# Patient Record
Sex: MALE | Race: WHITE | NOT HISPANIC OR LATINO | Employment: OTHER | URBAN - METROPOLITAN AREA
[De-identification: names, ages, dates, MRNs, and addresses within clinical notes are randomized per-mention and may not be internally consistent; named-entity substitution may affect disease eponyms.]

---

## 2021-01-08 ENCOUNTER — CLINICAL SUPPORT (OUTPATIENT)
Dept: URGENT CARE | Facility: CLINIC | Age: 31
End: 2021-01-08
Payer: MEDICARE

## 2021-01-08 DIAGNOSIS — Z11.52 ENCOUNTER FOR SCREENING LABORATORY TESTING FOR COVID-19 VIRUS: Primary | ICD-10-CM

## 2021-01-08 LAB
CTP QC/QA: YES
SARS-COV-2 RDRP RESP QL NAA+PROBE: NEGATIVE

## 2021-01-08 PROCEDURE — U0002: ICD-10-PCS | Mod: QW,CR,S$GLB, | Performed by: FAMILY MEDICINE

## 2021-01-08 PROCEDURE — U0002 COVID-19 LAB TEST NON-CDC: HCPCS | Mod: QW,CR,S$GLB, | Performed by: FAMILY MEDICINE

## 2022-07-06 ENCOUNTER — HOSPITAL ENCOUNTER (EMERGENCY)
Facility: HOSPITAL | Age: 32
Discharge: HOME | End: 2022-07-06
Attending: EMERGENCY MEDICINE
Payer: MEDICARE

## 2022-07-06 VITALS
OXYGEN SATURATION: 98 % | BODY MASS INDEX: 30.36 KG/M2 | RESPIRATION RATE: 17 BRPM | HEART RATE: 70 BPM | HEIGHT: 62 IN | DIASTOLIC BLOOD PRESSURE: 87 MMHG | SYSTOLIC BLOOD PRESSURE: 123 MMHG | TEMPERATURE: 99 F | WEIGHT: 165 LBS

## 2022-07-06 DIAGNOSIS — R53.1 WEAKNESS: ICD-10-CM

## 2022-07-06 LAB
ALBUMIN SERPL BCP-MCNC: 4.1 G/DL (ref 3.5–5.2)
ALP SERPL-CCNC: 58 U/L (ref 55–135)
ALT SERPL W/O P-5'-P-CCNC: 39 U/L (ref 10–44)
ANION GAP SERPL CALC-SCNC: 8 MMOL/L (ref 8–16)
AST SERPL-CCNC: 29 U/L (ref 10–40)
BASOPHILS # BLD AUTO: 0.05 K/UL (ref 0–0.2)
BASOPHILS NFR BLD: 0.7 % (ref 0–1.9)
BILIRUB SERPL-MCNC: 0.6 MG/DL (ref 0.1–1)
BILIRUB UR QL STRIP: NEGATIVE
BUN SERPL-MCNC: 11 MG/DL (ref 6–20)
CALCIUM SERPL-MCNC: 9 MG/DL (ref 8.7–10.5)
CHLORIDE SERPL-SCNC: 107 MMOL/L (ref 95–110)
CK SERPL-CCNC: 210 U/L (ref 20–200)
CLARITY UR: CLEAR
CO2 SERPL-SCNC: 21 MMOL/L (ref 23–29)
COLOR UR: YELLOW
CREAT SERPL-MCNC: 0.8 MG/DL (ref 0.5–1.4)
DIFFERENTIAL METHOD: ABNORMAL
EOSINOPHIL # BLD AUTO: 0.1 K/UL (ref 0–0.5)
EOSINOPHIL NFR BLD: 0.7 % (ref 0–8)
ERYTHROCYTE [DISTWIDTH] IN BLOOD BY AUTOMATED COUNT: 11.9 % (ref 11.5–14.5)
EST. GFR  (AFRICAN AMERICAN): >60 ML/MIN/1.73 M^2
EST. GFR  (NON AFRICAN AMERICAN): >60 ML/MIN/1.73 M^2
GLUCOSE SERPL-MCNC: 107 MG/DL (ref 70–110)
GLUCOSE UR QL STRIP: NEGATIVE
HCT VFR BLD AUTO: 47.9 % (ref 40–54)
HGB BLD-MCNC: 16.5 G/DL (ref 14–18)
HGB UR QL STRIP: NEGATIVE
IMM GRANULOCYTES # BLD AUTO: 0.02 K/UL (ref 0–0.04)
IMM GRANULOCYTES NFR BLD AUTO: 0.3 % (ref 0–0.5)
KETONES UR QL STRIP: NEGATIVE
LEUKOCYTE ESTERASE UR QL STRIP: NEGATIVE
LYMPHOCYTES # BLD AUTO: 2.2 K/UL (ref 1–4.8)
LYMPHOCYTES NFR BLD: 32.8 % (ref 18–48)
MAGNESIUM SERPL-MCNC: 1.9 MG/DL (ref 1.6–2.6)
MCH RBC QN AUTO: 31.1 PG (ref 27–31)
MCHC RBC AUTO-ENTMCNC: 34.4 G/DL (ref 32–36)
MCV RBC AUTO: 90 FL (ref 82–98)
MONOCYTES # BLD AUTO: 0.8 K/UL (ref 0.3–1)
MONOCYTES NFR BLD: 11.1 % (ref 4–15)
NEUTROPHILS # BLD AUTO: 3.7 K/UL (ref 1.8–7.7)
NEUTROPHILS NFR BLD: 54.4 % (ref 38–73)
NITRITE UR QL STRIP: NEGATIVE
NRBC BLD-RTO: 0 /100 WBC
PH UR STRIP: 7 [PH] (ref 5–8)
PLATELET # BLD AUTO: 244 K/UL (ref 150–450)
PMV BLD AUTO: 8.9 FL (ref 9.2–12.9)
POTASSIUM SERPL-SCNC: 3.3 MMOL/L (ref 3.5–5.1)
PROT SERPL-MCNC: 7.3 G/DL (ref 6–8.4)
PROT UR QL STRIP: NEGATIVE
RBC # BLD AUTO: 5.31 M/UL (ref 4.6–6.2)
SODIUM SERPL-SCNC: 136 MMOL/L (ref 136–145)
SP GR UR STRIP: 1.01 (ref 1–1.03)
TSH SERPL DL<=0.005 MIU/L-ACNC: 1.46 UIU/ML (ref 0.34–5.6)
URN SPEC COLLECT METH UR: NORMAL
UROBILINOGEN UR STRIP-ACNC: NEGATIVE EU/DL
WBC # BLD AUTO: 6.83 K/UL (ref 3.9–12.7)

## 2022-07-06 PROCEDURE — 80053 COMPREHEN METABOLIC PANEL: CPT | Performed by: STUDENT IN AN ORGANIZED HEALTH CARE EDUCATION/TRAINING PROGRAM

## 2022-07-06 PROCEDURE — 93010 EKG 12-LEAD: ICD-10-PCS | Mod: ,,, | Performed by: INTERNAL MEDICINE

## 2022-07-06 PROCEDURE — 80177 DRUG SCRN QUAN LEVETIRACETAM: CPT | Performed by: STUDENT IN AN ORGANIZED HEALTH CARE EDUCATION/TRAINING PROGRAM

## 2022-07-06 PROCEDURE — 84443 ASSAY THYROID STIM HORMONE: CPT | Performed by: STUDENT IN AN ORGANIZED HEALTH CARE EDUCATION/TRAINING PROGRAM

## 2022-07-06 PROCEDURE — 85025 COMPLETE CBC W/AUTO DIFF WBC: CPT | Performed by: STUDENT IN AN ORGANIZED HEALTH CARE EDUCATION/TRAINING PROGRAM

## 2022-07-06 PROCEDURE — 93010 ELECTROCARDIOGRAM REPORT: CPT | Mod: ,,, | Performed by: INTERNAL MEDICINE

## 2022-07-06 PROCEDURE — 83735 ASSAY OF MAGNESIUM: CPT | Performed by: STUDENT IN AN ORGANIZED HEALTH CARE EDUCATION/TRAINING PROGRAM

## 2022-07-06 PROCEDURE — 25000003 PHARM REV CODE 250: Performed by: STUDENT IN AN ORGANIZED HEALTH CARE EDUCATION/TRAINING PROGRAM

## 2022-07-06 PROCEDURE — 93005 ELECTROCARDIOGRAM TRACING: CPT | Performed by: INTERNAL MEDICINE

## 2022-07-06 PROCEDURE — 99285 EMERGENCY DEPT VISIT HI MDM: CPT | Mod: 25

## 2022-07-06 PROCEDURE — 82550 ASSAY OF CK (CPK): CPT | Performed by: STUDENT IN AN ORGANIZED HEALTH CARE EDUCATION/TRAINING PROGRAM

## 2022-07-06 PROCEDURE — 81003 URINALYSIS AUTO W/O SCOPE: CPT | Performed by: STUDENT IN AN ORGANIZED HEALTH CARE EDUCATION/TRAINING PROGRAM

## 2022-07-06 RX ORDER — ACETAMINOPHEN 500 MG
500-1000 TABLET ORAL EVERY 8 HOURS PRN
COMMUNITY
End: 2023-06-15 | Stop reason: CLARIF

## 2022-07-06 RX ORDER — MULTIVITAMIN
1 TABLET ORAL DAILY
COMMUNITY
Start: 2022-04-12

## 2022-07-06 RX ORDER — OMEGA-3-ACID ETHYL ESTERS 1 G/1
2 CAPSULE, LIQUID FILLED ORAL DAILY
COMMUNITY
End: 2023-06-15 | Stop reason: CLARIF

## 2022-07-06 RX ORDER — NAPROXEN 500 MG/1
500 TABLET ORAL 2 TIMES DAILY WITH MEALS
COMMUNITY
End: 2023-06-15 | Stop reason: CLARIF

## 2022-07-06 RX ORDER — CALCIUM CARBONATE/VITAMIN D3 600MG-5MCG
1 TABLET ORAL 2 TIMES DAILY
COMMUNITY
Start: 2022-04-20

## 2022-07-06 RX ORDER — TOPIRAMATE 50 MG/1
50 TABLET, FILM COATED ORAL 2 TIMES DAILY
COMMUNITY

## 2022-07-06 RX ORDER — POTASSIUM CHLORIDE 20 MEQ/1
40 TABLET, EXTENDED RELEASE ORAL ONCE
Status: COMPLETED | OUTPATIENT
Start: 2022-07-06 | End: 2022-07-06

## 2022-07-06 RX ORDER — OLANZAPINE 15 MG/1
15 TABLET ORAL NIGHTLY
COMMUNITY

## 2022-07-06 RX ORDER — GUANFACINE 4 MG/1
4 TABLET, EXTENDED RELEASE ORAL DAILY
COMMUNITY
End: 2023-06-15 | Stop reason: CLARIF

## 2022-07-06 RX ORDER — CALCIUM CARBONATE/VITAMIN D3 600MG-5MCG
TABLET ORAL
COMMUNITY
Start: 2022-04-20 | End: 2022-07-06 | Stop reason: CLARIF

## 2022-07-06 RX ORDER — FLUTICASONE PROPIONATE 50 MCG
1 SPRAY, SUSPENSION (ML) NASAL DAILY
Status: ON HOLD | COMMUNITY
End: 2023-07-31 | Stop reason: HOSPADM

## 2022-07-06 RX ORDER — LEVETIRACETAM 750 MG/1
750 TABLET ORAL 2 TIMES DAILY
COMMUNITY

## 2022-07-06 RX ORDER — CARBAMAZEPINE 200 MG/1
200 TABLET ORAL 2 TIMES DAILY
COMMUNITY

## 2022-07-06 RX ORDER — LORATADINE 10 MG/1
10 TABLET ORAL DAILY
COMMUNITY

## 2022-07-06 RX ORDER — LEVOTHYROXINE SODIUM 75 UG/1
50 TABLET ORAL
COMMUNITY

## 2022-07-06 RX ORDER — TIAGABINE HYDROCHLORIDE 12 MG/1
6 TABLET ORAL 2 TIMES DAILY
COMMUNITY

## 2022-07-06 RX ADMIN — POTASSIUM CHLORIDE 40 MEQ: 1500 TABLET, EXTENDED RELEASE ORAL at 05:07

## 2022-07-06 NOTE — ED PROVIDER NOTES
Encounter Date: 7/6/2022       History     Chief Complaint   Patient presents with    Fall     Pt c/o increased loss of balance and coordination issues over the last three weeks. Pt typically has a limp while walking but has recently had to use a walker to ambulate.      31-year-old male history of cerebral palsy, seizure disorder presents emergency room for 3 weeks of worsening generalized weakness.  No trauma, no headache or visual changes.  No loss of consciousness.  No dizziness.  Patient reports compliance with the medication.  No breakthrough seizures recently.  Patient states that weakness mostly in legs and he has had to transition to walking with a walker.  No chest pain or shortness of breath.        Review of patient's allergies indicates:   Allergen Reactions    Codeine Diarrhea and Other (See Comments)     .      Bupropion Other (See Comments)     .      Haloperidol Other (See Comments)     .      Meperidine Other (See Comments)     .      Risperidone Other (See Comments)     .       No past medical history on file.  No past surgical history on file.  No family history on file.     Review of Systems   Constitutional: Negative for chills, fatigue and fever.   HENT: Negative for congestion, hearing loss, sore throat and trouble swallowing.    Eyes: Negative for visual disturbance.   Respiratory: Negative for cough, chest tightness and shortness of breath.    Cardiovascular: Negative for chest pain.   Gastrointestinal: Negative for abdominal pain and nausea.   Endocrine: Negative for polyuria.   Genitourinary: Negative for difficulty urinating.   Musculoskeletal: Negative for arthralgias and myalgias.   Skin: Negative for rash.   Neurological: Negative for dizziness and headaches.   Psychiatric/Behavioral: The patient is not nervous/anxious.    All other systems reviewed and are negative.      Physical Exam     Initial Vitals [07/06/22 1501]   BP Pulse Resp Temp SpO2   123/87 70 17 99.3 °F (37.4 °C)  98 %      MAP       --         Physical Exam    Nursing note and vitals reviewed.  Constitutional: He appears well-developed and well-nourished.   HENT:   Head: Normocephalic and atraumatic.   Eyes: Conjunctivae and EOM are normal.   Neck: Neck supple.   Cardiovascular: Normal rate, regular rhythm, normal heart sounds and intact distal pulses.   Pulmonary/Chest: Breath sounds normal. No respiratory distress.   Abdominal: Abdomen is soft. He exhibits distension. There is no abdominal tenderness.   Musculoskeletal:      Cervical back: Neck supple.      Comments: Minor contractures with associated loss of muscle tone bilateral forearms/wrist.  Patient ambulates with limp, uses walker.     Neurological:   Patient is alert and oriented to person, place, time, and event. GCS 15: Motor 6, Verbal 5, Eye 4. No focal neurologic deficits.  No facial droop, dysarthria, or aphasia.     CN 2-12 Intact.   -Patient has no deviation of baseline vision. Normal Confrontation (CN II)  -Extraocular movements are full, physiologic nystagmus is present. Eyes converge equally and pupils constrict with near focus. (CN III, IV, VI)  -Patient able to fully open and close jaw. Equal sensation over bilateral temples, cheeks, and jawline. (CN V)  -Patient able to raise eyebrows and expand cheeks (CN VII)  -Patient able to lateralize sounds bilaterally (CN VIII)  -Uvula is midline and rises symmetrically with soft palate on phonation, active gag reflex (CN IX, X)  -Patient with equal rise of shoulders and equal strength on resisted rotation of neck b/l. Strength 5/5. (CN XI)  -Patient able to stick out tongue at midline and move side to side, resists against deviation by me (CN XII)  PERRLA. No visual field defects.  Strength 5/5 bilateral upper and lower extremities. No atrophy. Reflexes 2+ throughout.  Negative Romberg.   No decreased proprioception sensation to suggest dorsal column injury. Patient able to verbalize up/down of DIP joint.  Patient able to identify pen in hand with eyes closed.   No decreased sensation to light touch, pain, or pressure to suggest spinothalamic pathway injury.  No cerebellar signs:  -No dysmetria. Heel-to-Shin and Finger-To-Nose b/l with smooth movements and no overshoot.   -No dysdiadochokinesis. Hand and Feet rapid alternating movements are quick, smooth, and rhythmic b/l.   -No pronator drift.  Gait is not abnormal. Not wide, patient able to walk heel-to-toe.     Skin: Skin is warm and dry. Capillary refill takes less than 2 seconds.   Psychiatric: He has a normal mood and affect. His behavior is normal. Judgment and thought content normal.         ED Course   Procedures  Labs Reviewed   CBC W/ AUTO DIFFERENTIAL - Abnormal; Notable for the following components:       Result Value    MCH 31.1 (*)     MPV 8.9 (*)     All other components within normal limits   COMPREHENSIVE METABOLIC PANEL - Abnormal; Notable for the following components:    Potassium 3.3 (*)     CO2 21 (*)     All other components within normal limits   CK - Abnormal; Notable for the following components:     (*)     All other components within normal limits   URINALYSIS, REFLEX TO URINE CULTURE    Narrative:     Specimen Source->Urine   TSH   MAGNESIUM   CARBAMAZEPINE LEVEL, TOTAL   LEVETIRACETAM  (KEPPRA) LEVEL        ECG Results          EKG 12-lead (In process)  Result time 07/06/22 16:55:58    In process by Interface, Lab In [facility] (07/06/22 16:55:58)                 Narrative:    Test Reason : R53.1,    Vent. Rate : 058 BPM     Atrial Rate : 058 BPM     P-R Int : 134 ms          QRS Dur : 084 ms      QT Int : 440 ms       P-R-T Axes : 049 029 029 degrees     QTc Int : 431 ms    Sinus bradycardia with sinus arrhythmia  Otherwise normal ECG  No previous ECGs available    Referred By: [email or web address]   SELF           Confirmed By:                             Imaging Results          X-Ray Shunt Series (Final result)  Result time 07/06/22 16:28:05     Final result by Justice Moise MD (07/06/22 16:28:05)                 Narrative:    Reason: b/l LE weakness    FINDINGS:    Left  shunt noted with shunt coursing through the left neck soft tissues and left anterior chest wall into the abdomen with tube terminating in right the hemiabdomen. No kinking of tube identified. The lungs are clear bilaterally. Unremarkable bowel gas pattern. Degenerative changes of the cervical spine observed. No gross soft tissue abnormality.    IMPRESSION:    Left  shunt catheter is grossly intact.    Electronically signed by:  Justice Moise DO  7/6/2022 4:28 PM CDT Workstation: [id number]                             CT Head Without Contrast (Final result)  Result time 07/06/22 15:58:48    Final result by Justice Moise MD (07/06/22 15:58:48)                 Narrative:    CMS MANDATED QUALITY DATA - CT RADIATION - 436    All CT scans at this facility utilize dose modulation, iterative reconstruction, and/or weight based dosing when appropriate to reduce radiation dose to as low as reasonably achievable.        Reason:  shunt with b/l LE weakness Balance off, hx of shunt surgery    TECHNIQUE: Head CT without IV contrast.    COMPARISON: None    FINDINGS:    Left  shunt noted with tip in the region of the right foramen of Em. Left cerebral hemisphere encephalomalacia observed. Gray-white matter distinction is otherwise preserved throughout the brain. No intracranial hemorrhage observed.    The ventricles and cisterns are maintained.    Calvarium is intact. Visualized sinuses are clear.    IMPRESSION:    1.  Left  shunt catheter noted.  2.  Left cerebral hemisphere encephalomalacia.  3.  No acute intracranial abnormality.        Electronically signed by:  Justice Moise DO  7/6/2022 3:58 PM CDT Workstation: [id number]                               Medications   potassium chloride SA CR tablet 40 mEq (40 mEq Oral Given 7/6/22 0940)     Medical Decision Making:   Initial  Assessment:   Nontoxic, well-appearing and in no acute distress.  No focal findings on initial neurologic exam.  Differential Diagnosis:    shunt occlusion, encephalopathy, myopathy, metabolic abnormality, infection  ED Management:  31-year-old male history of cerebral palsy with subsequent seizure disorder and  shunt presenting to the emergency room for 3 weeks of worsening bilateral lower extremity weakness.  No trauma, no abnormal neurologic findings.  CT head without acute intracranial abnormality but with left cerebral hemisphere encephalomalacia with  shunt catheter.  Shunt series plain films demonstrate grossly intact catheter.  Reexamination with no changes in neurologic status/neuro exam.  EKG sinus bradycardia, no ischemic changes.  Normal axis, normal intervals.  Labs with mild hypokalemia, however pleaded.  Mild CK elevation.  Overall, patient is nontoxic, well appearing with no acute or focal neurologic findings, no acute findings on imaging and stable neurologic exam is as without abnormal findings.  Plan for discharge home in stable condition with recommendation for close follow-up with established neurologist.  Patient and family members verbalized understanding.    Disposition:  Improved, discharged  Plan to discharge home with appropriate follow-up, including primary care manager.    I discussed the findings and plan of care with this patient.  All questions were answered to the patient's satisfaction.  Disposition plan as above.  Verbal and written discharge instructions provided to the patient on discharge.  Return precautions discussed prior to discharge.     I discuss this patient case with the cosigning physician, who agrees with diagnosis and plan of care. This note was written using the assistance of a dictation program and may contain grammatical errors.                       Clinical Impression:   Final diagnoses:  [R53.1] Weakness          ED Disposition Condition    Discharge  Stable        ED Prescriptions     None        Follow-up Information     Follow up With Specialties Details Why Contact Info Additional Information    Established Neurologist  Call in 1 day       [facility] - Emergency Dept Emergency Medicine Go to  As needed, If symptoms worsen [address] [address]  [phone] 1st floor           Charles Lopez PA-C  07/06/22 2000

## 2022-07-06 NOTE — DISCHARGE INSTRUCTIONS
Return to the emergency room promptly for any new or worsening symptoms.  Call your neurologist tomorrow for soonest possible follow-up appointment.

## 2022-07-12 LAB — LEVETIRACETAM SERPL-MCNC: 13 UG/ML (ref 10–40)

## 2023-05-29 ENCOUNTER — TELEPHONE (OUTPATIENT)
Dept: NEUROSURGERY | Facility: CLINIC | Age: 33
End: 2023-05-29
Payer: MEDICARE

## 2023-05-29 NOTE — TELEPHONE ENCOUNTER
----- Message from Ada Putnam sent at 5/26/2023 10:28 AM CDT -----  Regarding: advice  Contact: Dr Chidi stanley for [facility]  Type: Needs Medical Advice  Who Called:  Dr Chidi stanley for [facility]  Symptoms (please be specific):    How long has patient had these symptoms:    Pharmacy name and phone #:    Best Call Back Number: [phone]  Additional Information: Doctor is trying to help his investigator's son find a neurosurgeon that can remove a vagal nerve stimulator. Patient is needing a MRI as soon as possible. Patient is currently in a group home and is having a neuromuscular decline. Please call Dr Tafoya to advise or call patient's father Gabriel Low at [phone]. Thanks!

## 2023-05-31 ENCOUNTER — OFFICE VISIT (OUTPATIENT)
Dept: NEUROSURGERY | Facility: CLINIC | Age: 33
End: 2023-05-31
Payer: MEDICARE

## 2023-05-31 VITALS
RESPIRATION RATE: 18 BRPM | DIASTOLIC BLOOD PRESSURE: 104 MMHG | HEART RATE: 68 BPM | WEIGHT: 164.88 LBS | HEIGHT: 62 IN | SYSTOLIC BLOOD PRESSURE: 154 MMHG | BODY MASS INDEX: 30.34 KG/M2

## 2023-05-31 DIAGNOSIS — G40.909 NONINTRACTABLE EPILEPSY WITHOUT STATUS EPILEPTICUS, UNSPECIFIED EPILEPSY TYPE: Primary | ICD-10-CM

## 2023-05-31 PROCEDURE — 99204 PR OFFICE/OUTPT VISIT, NEW, LEVL IV, 45-59 MIN: ICD-10-PCS | Mod: S$GLB,,, | Performed by: NEUROLOGICAL SURGERY

## 2023-05-31 PROCEDURE — 99204 OFFICE O/P NEW MOD 45 MIN: CPT | Mod: S$GLB,,, | Performed by: NEUROLOGICAL SURGERY

## 2023-05-31 RX ORDER — GABAPENTIN 300 MG/1
300 CAPSULE ORAL NIGHTLY
COMMUNITY

## 2023-05-31 NOTE — PROGRESS NOTES
Neurosurgery History & Physical    Patient ID: Esvin Low is a 32 y.o. male.    Chief Complaint   Patient presents with    Shunt Problem     Needs vns removed due to forthcoming mri.       History of Present Illness:   Mr. 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 He has had partial epilepsy and VNS placed in 2006 at [facility] by Dr. Tricia Sanderson. His mother reports that seizures stopped after the stimulator was placed. The lead broke/disconnected about 1 year after it was placed.     He is established with Dr. Ridley in Neurology in [place] who recently retired. He has not interrogated or addressed the VNS in any way.    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 However he needs an MRI for further workup and cannot have one with the VNS implant. This workup began after he has had progressive extremity weakness. He was walking until about 8 months ago.     Initially workup began with lumbar spine issues. He saw Dr. Hernandez who ruled out significant neural compression, only minor degenerative changes.     He presents today to discuss removal in order for him to undergo imaging but concerned that this could cause return of seizures.      The VNS is Liva[employer].     Review of Systems  No Fevers/chills/nausea/vomiting according to caretakers    History reviewed. No pertinent past medical history.  Social History     Socioeconomic History    Marital status: Single     History reviewed. No pertinent family history.  Review of patient's allergies indicates:   Allergen Reactions    Codeine Diarrhea and Other (See Comments)     .      Bupropion Other (See Comments)     .      Haloperidol Other (See Comments)     .      Meperidine Other (See Comments)     .      Risperidone Other (See Comments)     .         Current Outpatient Medications:     acetaminophen (TYLENOL) 500 MG tablet, Take 500-1,000 mg by mouth every 8 (eight) hours  "as needed., Disp: , Rfl:     calcium-vitamin D tablet 600 mg-200 units, Take 1 tablet by mouth 2 (two) times daily., Disp: , Rfl:     carBAMazepine (TEGRETOL) 200 mg tablet, Take 200 mg by mouth 2 (two) times a day., Disp: , Rfl:     gabapentin (NEURONTIN) 300 MG capsule, Take 300 mg by mouth once daily., Disp: , Rfl:     guanFACINE (INTUNIV ER) 4 mg Tb24, Take 4 mg by mouth once daily., Disp: , Rfl:     levETIRAcetam (KEPPRA) 750 MG Tab, Take 750 mg by mouth 2 (two) times daily., Disp: , Rfl:     levothyroxine (SYNTHROID) 75 MCG tablet, Take 75 mcg by mouth before breakfast., Disp: , Rfl:     loratadine (CLARITIN) 10 mg tablet, Take 10 mg by mouth once daily., Disp: , Rfl:     naproxen (NAPROSYN) 500 MG tablet, Take 500 mg by mouth 2 (two) times daily with meals., Disp: , Rfl:     OLANZapine (ZYPREXA) 15 MG Tab, Take 15 mg by mouth nightly., Disp: , Rfl:     omega-3 acid ethyl esters (LOVAZA) 1 gram capsule, Take 2 g by mouth once daily., Disp: , Rfl:     ONE DAILY MULTIVITAMIN tablet, Take 1 tablet by mouth once daily., Disp: , Rfl:     tiaGABine 12 mg tablet, Take 6 mg by mouth 2 (two) times a day., Disp: , Rfl:     topiramate (TOPAMAX) 50 MG tablet, Take 50 mg by mouth 2 (two) times daily., Disp: , Rfl:     fluticasone propionate (FLONASE) 50 mcg/actuation nasal spray, 1 spray by Each Nostril route once daily., Disp: , Rfl:   No current facility-administered medications for this visit.  Blood pressure (!) 154/104, pulse 68, resp. rate 18, height 5' 2" (1.575 m), weight 74.8 kg (164 lb 14.5 oz).      Neurologic Exam  Awake  Oriented to self  Sitting in wheelchair  Generator incision over right chest.  Lead incision over left neck, both well healed.    Imaging:   Xray shunt series dated 7/6/2022 is personally reviewed and discussed with the patient and caretakers.  The VNS generator is over the right chest.  The lead up towards the left neck.  The lead appears fractured at approximately the level of C7 in the " neck.    Assessment & Plan:   Mr. Low is a 32 year old gentleman with progressive extremity weakness currently undergoing workup for CIDP.     His long term neurologist recently retired and has not had the VNS interrogated in the recent past.  It is suspected that the generator is no longer working and that the lead is broken in the chest.  The patient has not had any seizures in some time.  The patient needs an MRI and there is hesitation from radiology about getting an MRI with VNS.      Recommended he establish with a seizure Neurologist.     Will reach out to Dr. Hastings to help with sooner appt and discuss with neuroradiologist at [facility] to discuss limitations and protocol for obtaining MRI in VNS patient.     We are certainly available for removal of whatever VNS components that are technically feasible however I want to verify that it is absolutely necessary for obtaining an MRI as well as that we do not risk him starting with seizures once again.

## 2023-06-06 ENCOUNTER — PATIENT MESSAGE (OUTPATIENT)
Dept: NEUROSURGERY | Facility: CLINIC | Age: 33
End: 2023-06-06
Payer: MEDICARE

## 2023-06-08 NOTE — TELEPHONE ENCOUNTER
Spoke with patient's mother regarding plan for surgery after Dr. Morejon's discussion with Dr. Hastings in Neurology in [place].     Returned call with more specifics, no answer. Will return call if needed or have nurse discuss with surgical planning.

## 2023-06-09 ENCOUNTER — TELEPHONE (OUTPATIENT)
Dept: NEUROSURGERY | Facility: CLINIC | Age: 33
End: 2023-06-09
Payer: MEDICARE

## 2023-06-09 DIAGNOSIS — G40.909 NONINTRACTABLE EPILEPSY WITHOUT STATUS EPILEPTICUS, UNSPECIFIED EPILEPSY TYPE: Primary | ICD-10-CM

## 2023-06-09 DIAGNOSIS — G40.901: ICD-10-CM

## 2023-06-09 DIAGNOSIS — R79.1 ABNORMAL COAGULATION PROFILE: ICD-10-CM

## 2023-06-09 RX ORDER — SODIUM CHLORIDE, SODIUM LACTATE, POTASSIUM CHLORIDE, CALCIUM CHLORIDE 600; 310; 30; 20 MG/100ML; MG/100ML; MG/100ML; MG/100ML
INJECTION, SOLUTION INTRAVENOUS CONTINUOUS
Status: CANCELLED | OUTPATIENT
Start: 2023-06-09

## 2023-06-09 RX ORDER — LIDOCAINE HYDROCHLORIDE 10 MG/ML
1 INJECTION, SOLUTION EPIDURAL; INFILTRATION; INTRACAUDAL; PERINEURAL ONCE
Status: CANCELLED | OUTPATIENT
Start: 2023-06-09 | End: 2023-06-09

## 2023-06-09 NOTE — TELEPHONE ENCOUNTER
----- Message from Pamela Gunn sent at 6/9/2023 10:00 AM CDT -----  Type:  Patient Returning Call    Who Called:  pt's mother (Millicent)  Who Left Message for Patient:  Danielle Michaud  Does the patient know what this is regarding?:  removing a device.  Best Call Back Number:  [phone]  Additional Information:  pt's mother stepped away from phone when she received the phone call. Please call back to advise. Thanks!

## 2023-06-15 ENCOUNTER — TELEPHONE (OUTPATIENT)
Dept: NEUROSURGERY | Facility: CLINIC | Age: 33
End: 2023-06-15
Payer: MEDICARE

## 2023-06-15 DIAGNOSIS — Z98.890 HISTORY OF REMOVAL OF RETAINED HARDWARE: Primary | ICD-10-CM

## 2023-06-15 DIAGNOSIS — R79.1 ABNORMAL COAGULATION PROFILE: ICD-10-CM

## 2023-06-15 DIAGNOSIS — G40.901 NONINTRACTABLE EPILEPSY WITH STATUS EPILEPTICUS, UNSPECIFIED EPILEPSY TYPE: ICD-10-CM

## 2023-06-15 RX ORDER — SODIUM CHLORIDE, SODIUM LACTATE, POTASSIUM CHLORIDE, CALCIUM CHLORIDE 600; 310; 30; 20 MG/100ML; MG/100ML; MG/100ML; MG/100ML
INJECTION, SOLUTION INTRAVENOUS CONTINUOUS
Status: CANCELLED | OUTPATIENT
Start: 2023-06-15

## 2023-06-15 RX ORDER — LIDOCAINE HYDROCHLORIDE 10 MG/ML
1 INJECTION, SOLUTION EPIDURAL; INFILTRATION; INTRACAUDAL; PERINEURAL ONCE
Status: CANCELLED | OUTPATIENT
Start: 2023-06-15 | End: 2023-06-15

## 2023-06-15 NOTE — TELEPHONE ENCOUNTER
----- Message from Abby Wright LPN sent at 6/15/2023  1:22 PM CDT -----  Regarding: pending case dos 6/16/23  Your request for surgery IP dos 6/16/23 was not approved because [employer] is stating that you are OON and the patient does not have OON benefit.   Ms Acevedo ,pt mother, was notified of denial and instructed to follow up with your office.  Please advise if you will continue with the case or cancel the procedure.   Thanks,   Abby Wright LPN  Precertification Nurse  [facility]  PHONE: [phone]  FAX:[phone]  [email or web address]

## 2023-06-23 ENCOUNTER — TELEPHONE (OUTPATIENT)
Dept: NEUROSURGERY | Facility: CLINIC | Age: 33
End: 2023-06-23
Payer: MEDICARE

## 2023-07-06 ENCOUNTER — TELEPHONE (OUTPATIENT)
Dept: NEUROSURGERY | Facility: CLINIC | Age: 33
End: 2023-07-06
Payer: MEDICARE

## 2023-07-06 NOTE — TELEPHONE ENCOUNTER
----- Message from Tamica Saini LPN sent at 7/5/2023 10:17 AM CDT -----  Contact: Pt Michael Ricks    ----- Message -----  From: Kush Poole, Patient Care Assistant  Sent: 7/5/2023   8:40 AM CDT  To: [facility] Staff    Type: Needs Medical Advice    Who Called: Pt Michael Ricks   Best Call Back Number: [phone] or  Maricel-Caregiver [phone]  Inquiry/Question: Millicent is calling to get the pt scheduled for a procedure. Please advise. Thank you~

## 2023-07-06 NOTE — TELEPHONE ENCOUNTER
Gave caregiver CPT code 30368 for VNS removal so that she can call insurance to get an estimate of copay and authorization

## 2023-07-13 ENCOUNTER — TELEPHONE (OUTPATIENT)
Dept: NEUROSURGERY | Facility: CLINIC | Age: 33
End: 2023-07-13
Payer: MEDICARE

## 2023-07-13 NOTE — TELEPHONE ENCOUNTER
Patient now has medicare a and b and would like to have surgery with Dr. Morejon. I told her we can plan for surgery on 7/31 1st case at this time. She asked that we keep in contact with [facility] [place], [place] #[phone] with all scheduling. Waiting on surgical sheet from Dr. Morejon for surgery to be put in.

## 2023-07-14 DIAGNOSIS — G40.901 NONINTRACTABLE EPILEPSY WITH STATUS EPILEPTICUS, UNSPECIFIED EPILEPSY TYPE: Primary | ICD-10-CM

## 2023-07-14 DIAGNOSIS — R79.1 ABNORMAL COAGULATION PROFILE: ICD-10-CM

## 2023-07-14 RX ORDER — LIDOCAINE HYDROCHLORIDE 10 MG/ML
1 INJECTION, SOLUTION EPIDURAL; INFILTRATION; INTRACAUDAL; PERINEURAL ONCE
Status: CANCELLED | OUTPATIENT
Start: 2023-07-14 | End: 2023-07-14

## 2023-07-14 RX ORDER — SODIUM CHLORIDE, SODIUM LACTATE, POTASSIUM CHLORIDE, CALCIUM CHLORIDE 600; 310; 30; 20 MG/100ML; MG/100ML; MG/100ML; MG/100ML
INJECTION, SOLUTION INTRAVENOUS CONTINUOUS
Status: CANCELLED | OUTPATIENT
Start: 2023-07-14

## 2023-07-18 ENCOUNTER — TELEPHONE (OUTPATIENT)
Dept: NEUROSURGERY | Facility: CLINIC | Age: 33
End: 2023-07-18
Payer: MEDICARE

## 2023-07-18 NOTE — TELEPHONE ENCOUNTER
Called patient's mother Myron( manager of [facility]) regarding upcoming surgery on 7/31/23 with Dr. Morejon. Pre and post-operative appointments reviewed. Patient aware of stopping all anti-coagulant, anti-inflammatory, anti-platelet medications for 1 week prior to surgery. Address confirmed and appointment reminder letter and post-operative instructions mailed to patient. Informed patient to call with any further questions. Patient verbalized understanding.

## 2023-07-21 ENCOUNTER — TELEPHONE (OUTPATIENT)
Dept: NEUROSURGERY | Facility: CLINIC | Age: 33
End: 2023-07-21
Payer: MEDICARE

## 2023-07-21 NOTE — TELEPHONE ENCOUNTER
----- Message from Karen Herrera sent at 7/20/2023  4:19 PM CDT -----  Regarding: cardiac clearance  In addition to my previous message, Dr. Breaux was notified of pt's EKG and stated that pt will now need a cardiac clearance. Thank you for your help with this matter.  Please ask for either Karen or Sarah if you call here at PAT.  [phone]

## 2023-07-25 ENCOUNTER — TELEPHONE (OUTPATIENT)
Dept: NEUROSURGERY | Facility: CLINIC | Age: 33
End: 2023-07-25
Payer: MEDICARE

## 2023-07-27 ENCOUNTER — OFFICE VISIT (OUTPATIENT)
Dept: CARDIOLOGY | Facility: CLINIC | Age: 33
End: 2023-07-27
Payer: MEDICARE

## 2023-07-27 VITALS
DIASTOLIC BLOOD PRESSURE: 88 MMHG | HEART RATE: 96 BPM | HEIGHT: 65 IN | BODY MASS INDEX: 23.47 KG/M2 | WEIGHT: 140.88 LBS | SYSTOLIC BLOOD PRESSURE: 132 MMHG

## 2023-07-27 DIAGNOSIS — G12.21 ALS (AMYOTROPHIC LATERAL SCLEROSIS): Chronic | ICD-10-CM

## 2023-07-27 DIAGNOSIS — I10 PRIMARY HYPERTENSION: Primary | Chronic | ICD-10-CM

## 2023-07-27 DIAGNOSIS — R01.1 SYSTOLIC MURMUR: ICD-10-CM

## 2023-07-27 DIAGNOSIS — Z01.810 ENCOUNTER FOR PRE-OPERATIVE CARDIOVASCULAR CLEARANCE: ICD-10-CM

## 2023-07-27 DIAGNOSIS — I51.7 LVH (LEFT VENTRICULAR HYPERTROPHY): ICD-10-CM

## 2023-07-27 DIAGNOSIS — R53.2 COMPLETE IMMOBILITY DUE TO SEVERE PHYSICAL DISABILITY OR FRAILTY: Chronic | ICD-10-CM

## 2023-07-27 PROCEDURE — 99204 OFFICE O/P NEW MOD 45 MIN: CPT | Mod: S$PBB,,, | Performed by: INTERNAL MEDICINE

## 2023-07-27 PROCEDURE — 99999 PR PBB SHADOW E&M-EST. PATIENT-LVL IV: CPT | Mod: PBBFAC,,, | Performed by: INTERNAL MEDICINE

## 2023-07-27 PROCEDURE — 99204 PR OFFICE/OUTPT VISIT, NEW, LEVL IV, 45-59 MIN: ICD-10-PCS | Mod: S$PBB,,, | Performed by: INTERNAL MEDICINE

## 2023-07-27 PROCEDURE — 99214 OFFICE O/P EST MOD 30 MIN: CPT | Mod: PBBFAC,PO | Performed by: INTERNAL MEDICINE

## 2023-07-27 PROCEDURE — 99999 PR PBB SHADOW E&M-EST. PATIENT-LVL IV: ICD-10-PCS | Mod: PBBFAC,,, | Performed by: INTERNAL MEDICINE

## 2023-07-27 NOTE — PROGRESS NOTES
Subjective:    Patient ID:  Esvin Low is a 32 y.o. male who presents for Establish Care, Hypertension, and PREOP CLEARANCE        HPI  NP EVALUATION, PRIOR TO SURGERY TO REMOVE VAGAL NERVE STIMULATOR, TO BE ABLE TO GET MRI, FOR MORE DEFINITIVE DIAGNOSIS ALS VERSUS OTHER DEMYELINATING DISEASE,  LIVES IN A GROUP HOME, ACCOMPANIED BY MOTHER, PROGRESSIVE NEUROLOGIC ISSUES FOR OVER A YEAR, IN ,HAS NOT WALKED SINCE LAST December,  RECENT KIDNEY STONE REMOVAL UNDER GENERAL ANESTHESIA, NO COMPLICATIONS, NO SYMPTOMS OF ANGINA OR HEART FAILURE, NO FH OF CAD, SEE ROS    Past Medical History:   Diagnosis Date    Cerebral palsy, unspecified     CIDP (chronic inflammatory demyelinating polyneuropathy)     Fecal incontinence     Intracerebral hemorrhage     AT BIRTH    Mentally disabled     5TH GRADE LEVEL    Renal disorder     RIGHT KIDNEY STONES    Seizures     PARTIAL EPILEPSY    Thyroid disease     hypo, per MD notes     Past Surgical History:   Procedure Laterality Date    CYSTOURETEROSCOPY  06/01/2023    URETERAL STENT    FEET SURGERY      HERNIA REPAIR      RIGHT FEMUR      WITH HARDWARE    VAGAL NERVE STIMULATOR REMOVAL      VENTRICULOPERITONEAL SHUNT       No family history on file.  Social History     Socioeconomic History    Marital status: Single   Tobacco Use    Smoking status: Never    Smokeless tobacco: Never   Substance and Sexual Activity    Alcohol use: Yes     Comment: occasional beer    Drug use: Never       Review of patient's allergies indicates:   Allergen Reactions    Codeine Diarrhea and Other (See Comments)     .Hyperactivity, no pain control    Bupropion Other (See Comments)     .      Meperidine Other (See Comments)     .  Mother doesn't remember reaction    Risperidone Other (See Comments)     .      Haloperidol Other (See Comments)     Drools       Current Outpatient Medications:     acetaminophen (TYLENOL) 500 MG tablet, Take 500 mg by mouth every 6 (six) hours as needed for Pain., Disp: ,  Rfl:     calcium-vitamin D tablet 600 mg-200 units, Take 1 tablet by mouth 2 (two) times daily., Disp: , Rfl:     carBAMazepine (TEGRETOL) 200 mg tablet, Take 200 mg by mouth 2 (two) times a day., Disp: , Rfl:     gabapentin (NEURONTIN) 300 MG capsule, Take 300 mg by mouth every evening., Disp: , Rfl:     guanFACINE (TENEX) 2 MG tablet, Take 4 mg by mouth every evening., Disp: , Rfl:     levETIRAcetam (KEPPRA) 750 MG Tab, Take 750 mg by mouth 2 (two) times daily., Disp: , Rfl:     levothyroxine (SYNTHROID) 75 MCG tablet, Take 50 mcg by mouth before breakfast., Disp: , Rfl:     loratadine (CLARITIN) 10 mg tablet, Take 10 mg by mouth once daily., Disp: , Rfl:     metoprolol tartrate (LOPRESSOR) 25 MG tablet, Take 25 mg by mouth 2 (two) times daily., Disp: , Rfl:     naproxen sodium (ANAPROX) 220 MG tablet, Take 220 mg by mouth 2 (two) times daily with meals., Disp: , Rfl:     OLANZapine (ZYPREXA) 15 MG Tab, Take 15 mg by mouth nightly., Disp: , Rfl:     ONE DAILY MULTIVITAMIN tablet, Take 1 tablet by mouth once daily., Disp: , Rfl:     tamsulosin (FLOMAX) 0.4 mg Cap, Take 0.4 mg by mouth once daily., Disp: , Rfl:     tiaGABine 12 mg tablet, Take 6 mg by mouth 2 (two) times a day., Disp: , Rfl:     topiramate (TOPAMAX) 50 MG tablet, Take 50 mg by mouth 2 (two) times daily., Disp: , Rfl:     fluticasone propionate (FLONASE) 50 mcg/actuation nasal spray, 1 spray by Each Nostril route once daily., Disp: , Rfl:     Review of Systems   Constitutional: Negative for chills, diaphoresis and fever.   HENT:  Negative for congestion and nosebleeds.    Eyes:  Negative for blurred vision and visual disturbance.   Cardiovascular:  Negative for chest pain, cyanosis, irregular heartbeat, leg swelling, palpitations and paroxysmal nocturnal dyspnea.   Respiratory:  Negative for cough, hemoptysis and shortness of breath.    Gastrointestinal:  Positive for dysphagia (SOME). Negative for abdominal pain, jaundice, melena and nausea.  "  Genitourinary:  Negative for flank pain.        LOKC   Neurological:  Positive for weakness. Negative for brief paralysis.   Psychiatric/Behavioral:  Negative for altered mental status.    Allergic/Immunologic: Negative for persistent infections.      Objective:      Vitals:    07/27/23 1748 07/27/23 1828   BP: (!) 139/104 132/88   Pulse: 96    Weight: 63.9 kg (140 lb 14 oz)    Height: 5' 5" (1.651 m)    PainSc: 0-No pain      Body mass index is 23.44 kg/m².    Physical Exam  Eyes:      Extraocular Movements: Extraocular movements intact.      Conjunctiva/sclera: Conjunctivae normal.   Cardiovascular:      Rate and Rhythm: Normal rate and regular rhythm.      Pulses:           Carotid pulses are 2+ on the right side and 2+ on the left side.       Radial pulses are 2+ on the right side and 2+ on the left side.      Heart sounds: Murmur heard.     No friction rub. No gallop.   Pulmonary:      Breath sounds: Normal breath sounds. No rales.   Abdominal:      Palpations: Abdomen is soft.      Tenderness: There is no abdominal tenderness.   Musculoskeletal:      Right lower leg: No edema.      Left lower leg: No edema.      Comments: IN WC, , CONTRACTIONS   Skin:     General: Skin is warm and dry.   Neurological:      Mental Status: He is alert.      Motor: Weakness present.   Psychiatric:         Attention and Perception: Attention normal.         Speech: Speech is delayed.               ..    Chemistry        Component Value Date/Time     07/20/2023 1308    K 4.6 07/20/2023 1308     07/20/2023 1308    CO2 20 (L) 07/20/2023 1308    BUN 11 07/20/2023 1308    CREATININE 0.61 07/20/2023 1308    GLU 89 07/20/2023 1308        Component Value Date/Time    CALCIUM 9.5 07/20/2023 1308    ALKPHOS 91 07/20/2023 1308    AST 29 07/20/2023 1308    ALT 21 07/20/2023 1308    BILITOT 0.4 07/20/2023 1308    ESTGFRAFRICA >60.0 07/06/2022 1529    EGFRNONAA >60.0 07/06/2022 1529            ..No results found for: CHOL  No " results found for: HDL  No results found for: LDLCALC  No results found for: TRIG  No results found for: CHOLHDL  ..  Lab Results   Component Value Date    WBC 5.00 07/20/2023    HGB 14.8 07/20/2023    HCT 44.2 07/20/2023    MCV 91 07/20/2023     (L) 07/20/2023       Test(s) Reviewed  I have reviewed the following in detail:  [] Stress test   [] Angiography   [] Echocardiogram   [x] Labs   [x] Other:       Assessment:         ICD-10-CM ICD-9-CM   1. Primary hypertension  I10 401.9   2. Encounter for pre-operative cardiovascular clearance  Z01.810 V72.81   3. Systolic murmur  R01.1 785.2   4. LVH (left ventricular hypertrophy)  I51.7 429.3   5. ALS (amyotrophic lateral sclerosis)  G12.21 335.20   6. Complete immobility due to severe physical disability or frailty  R53.2 780.72     Problem List Items Addressed This Visit          Neuro    ALS (amyotrophic lateral sclerosis)       Cardiac/Vascular    Primary hypertension - Primary    LVH (left ventricular hypertrophy)    Relevant Orders    Echo (Completed)    Encounter for pre-operative cardiovascular clearance    Systolic murmur    Relevant Orders    Echo (Completed)       Other    Complete immobility due to severe physical disability or frailty        Plan:     EKG SR, LVH,  ACCEPTABLE CV RISK, FOR SURGERY NO EVIDENCE OF CARDIOVASCULAR COMPROMISE NO SYMPTOMS OF ANGINA OR HEART FAILURE WOULD LIKE TO GET AN ECHOCARDIOGRAM PRIOR TO SURGERY IF POSSIBLE, DISCUSSED PLAN WITH THE PATIENT, MOTHER AND CAREGIVER,ALL CV CLINICALLY STABLE, NO ANGINA, NO HF, NO TIA, NO CLINICAL ARRHYTHMIA,CONTINUE CURRENT MEDS, AND WATCH BLOOD PRESSURE      Primary hypertension    Encounter for pre-operative cardiovascular clearance    Systolic murmur  -     Echo    LVH (left ventricular hypertrophy)  -     Echo    ALS (amyotrophic lateral sclerosis)    Complete immobility due to severe physical disability or frailty    RTC Low level/low impact aerobic exercise 5x's/wk. Heart healthy diet  and risk factor modification.    See labs and med orders.    Aerobic exercise 5x's/wk. Heart healthy diet and risk factor modification.    See labs and med orders.

## 2023-07-28 ENCOUNTER — CLINICAL SUPPORT (OUTPATIENT)
Dept: CARDIOLOGY | Facility: HOSPITAL | Age: 33
End: 2023-07-28
Attending: INTERNAL MEDICINE
Payer: MEDICARE

## 2023-07-28 ENCOUNTER — TELEPHONE (OUTPATIENT)
Dept: NEUROSURGERY | Facility: CLINIC | Age: 33
End: 2023-07-28
Payer: MEDICARE

## 2023-07-28 VITALS — BODY MASS INDEX: 23.32 KG/M2 | HEIGHT: 65 IN | WEIGHT: 140 LBS

## 2023-07-28 DIAGNOSIS — R79.1 ABNORMAL COAGULATION PROFILE: ICD-10-CM

## 2023-07-28 DIAGNOSIS — Z01.818 PRE-OP TESTING: Primary | ICD-10-CM

## 2023-07-28 PROBLEM — R53.2 COMPLETE IMMOBILITY DUE TO SEVERE PHYSICAL DISABILITY OR FRAILTY: Status: ACTIVE | Noted: 2023-07-28

## 2023-07-28 LAB
ASCENDING AORTA: 2.56 CM
AV INDEX (PROSTH): 1.04
AV MEAN GRADIENT: 2 MMHG
AV PEAK GRADIENT: 4 MMHG
AV VALVE AREA: 3.42 CM2
AV VELOCITY RATIO: 0.97
BSA FOR ECHO PROCEDURE: 1.71 M2
CV ECHO LV RWT: 0.35 CM
DOP CALC AO PEAK VEL: 0.99 M/S
DOP CALC AO VTI: 16.6 CM
DOP CALC LVOT AREA: 3.3 CM2
DOP CALC LVOT DIAMETER: 2.05 CM
DOP CALC LVOT PEAK VEL: 0.96 M/S
DOP CALC LVOT STROKE VOLUME: 56.74 CM3
DOP CALCLVOT PEAK VEL VTI: 17.2 CM
E WAVE DECELERATION TIME: 147.32 MSEC
E/A RATIO: 0.82
E/E' RATIO: 3.83 M/S
ECHO LV POSTERIOR WALL: 0.79 CM (ref 0.6–1.1)
EJECTION FRACTION: 55 %
FRACTIONAL SHORTENING: 28 % (ref 28–44)
INTERVENTRICULAR SEPTUM: 0.79 CM (ref 0.6–1.1)
LA MAJOR: 4.05 CM
LA MINOR: 4.56 CM
LA WIDTH: 3.1 CM
LEFT ATRIUM SIZE: 2.42 CM
LEFT ATRIUM VOLUME INDEX: 16.1 ML/M2
LEFT ATRIUM VOLUME: 27.36 CM3
LEFT INTERNAL DIMENSION IN SYSTOLE: 3.26 CM (ref 2.1–4)
LEFT VENTRICLE DIASTOLIC VOLUME INDEX: 55.83 ML/M2
LEFT VENTRICLE DIASTOLIC VOLUME: 94.91 ML
LEFT VENTRICLE MASS INDEX: 67 G/M2
LEFT VENTRICLE SYSTOLIC VOLUME INDEX: 25.2 ML/M2
LEFT VENTRICLE SYSTOLIC VOLUME: 42.83 ML
LEFT VENTRICULAR INTERNAL DIMENSION IN DIASTOLE: 4.55 CM (ref 3.5–6)
LEFT VENTRICULAR MASS: 113.88 G
LV LATERAL E/E' RATIO: 3.29 M/S
LV SEPTAL E/E' RATIO: 4.6 M/S
LVOT MG: 1.97 MMHG
LVOT MV: 0.66 CM/S
MV PEAK A VEL: 0.56 M/S
MV PEAK E VEL: 0.46 M/S
MV STENOSIS PRESSURE HALF TIME: 42.72 MS
MV VALVE AREA P 1/2 METHOD: 5.15 CM2
PISA TR MAX VEL: 1.9 M/S
PULM VEIN S/D RATIO: 0.71
PV PEAK D VEL: 0.51 M/S
PV PEAK S VEL: 0.36 M/S
RA MAJOR: 4.05 CM
RA WIDTH: 2.6 CM
RIGHT VENTRICULAR END-DIASTOLIC DIMENSION: 2.87 CM
RIGHT VENTRICULAR LENGTH IN DIASTOLE (APICAL 4-CHAMBER VIEW): 7.08 CM
RV MID DIAMA: 2.83 CM
RV TISSUE DOPPLER FREE WALL SYSTOLIC VELOCITY 1 (APICAL 4 CHAMBER VIEW): 10.57 CM/S
SINUS: 2.74 CM
STJ: 2.37 CM
TDI LATERAL: 0.14 M/S
TDI SEPTAL: 0.1 M/S
TDI: 0.12 M/S
TR MAX PG: 14 MMHG
TRICUSPID ANNULAR PLANE SYSTOLIC EXCURSION: 1.87 CM

## 2023-07-28 PROCEDURE — 93306 TTE W/DOPPLER COMPLETE: CPT | Mod: 26,,, | Performed by: INTERNAL MEDICINE

## 2023-07-28 PROCEDURE — 93306 TTE W/DOPPLER COMPLETE: CPT | Mod: PO

## 2023-07-28 PROCEDURE — 93306 ECHO (CUPID ONLY): ICD-10-PCS | Mod: 26,,, | Performed by: INTERNAL MEDICINE

## 2023-07-28 NOTE — TELEPHONE ENCOUNTER
Orders for PTT and PT/INR added to be drawn morning of surgery (7/31/23). Spoke with Whitley in ambulatory care.

## 2023-08-15 ENCOUNTER — CLINICAL SUPPORT (OUTPATIENT)
Dept: NEUROSURGERY | Facility: CLINIC | Age: 33
End: 2023-08-15
Payer: MEDICARE

## 2023-08-15 DIAGNOSIS — Z47.2 AFTERCARE FOR REMOVAL OF FRACTURE PLATE OR INTERNAL FIXATION DEVICE: Primary | ICD-10-CM

## 2023-08-15 NOTE — PROGRESS NOTES
Pt arrived to visit with mother. Pt is here for nurse visit/ wound care for s/p VNS removal. Pt states he is doing fine with no reports of nausea or vomiting. Pt reports he has a headache, mom states he just left appt with PCP. Pt has active infection ,thrush, which is being treated by Dr. Gross with antibiotics. Incisions appear clean, dry, and intact. Incisions were cleaned with chloraprep, no complications. No signs of infection. Pt is to return to facility, next appt sheet given, mom and  gave v/u.

## 2023-10-12 ENCOUNTER — TELEPHONE (OUTPATIENT)
Dept: GASTROENTEROLOGY | Facility: CLINIC | Age: 33
End: 2023-10-12
Payer: MEDICARE

## 2023-10-12 NOTE — TELEPHONE ENCOUNTER
Spoke with patients mother she states he will be back in her care from the group home at some point next week given first available on 10/25 at 230pm for peg tube consult.

## 2023-10-12 NOTE — TELEPHONE ENCOUNTER
----- Message from Kecia Crowe sent at 10/12/2023 11:46 AM CDT -----  Regarding: Gastro Referral  .Good morning,     Current patient is being referred to Gastroenterology from Reina Desai NP for PEG Tube Placement. I have scanned the referral and records in to media mgr. Please contact pt to schedule and let me know if I can help any further.     Thank you,    [facility]   Fax: [phone]

## 2023-10-25 ENCOUNTER — OFFICE VISIT (OUTPATIENT)
Dept: GASTROENTEROLOGY | Facility: CLINIC | Age: 33
End: 2023-10-25
Payer: MEDICARE

## 2023-10-25 VITALS — SYSTOLIC BLOOD PRESSURE: 142 MMHG | HEART RATE: 107 BPM | DIASTOLIC BLOOD PRESSURE: 96 MMHG

## 2023-10-25 DIAGNOSIS — R63.30 FEEDING DIFFICULTY: ICD-10-CM

## 2023-10-25 DIAGNOSIS — G12.21 ALS (AMYOTROPHIC LATERAL SCLEROSIS): Primary | ICD-10-CM

## 2023-10-25 DIAGNOSIS — R53.2 COMPLETE IMMOBILITY DUE TO SEVERE PHYSICAL DISABILITY OR FRAILTY: ICD-10-CM

## 2023-10-25 DIAGNOSIS — Z98.2 S/P VP SHUNT: ICD-10-CM

## 2023-10-25 PROCEDURE — 99205 OFFICE O/P NEW HI 60 MIN: CPT | Mod: S$PBB,,, | Performed by: INTERNAL MEDICINE

## 2023-10-25 PROCEDURE — 99205 PR OFFICE/OUTPT VISIT, NEW, LEVL V, 60-74 MIN: ICD-10-PCS | Mod: S$PBB,,, | Performed by: INTERNAL MEDICINE

## 2023-10-25 PROCEDURE — 99999 PR PBB SHADOW E&M-EST. PATIENT-LVL II: CPT | Mod: PBBFAC,,, | Performed by: INTERNAL MEDICINE

## 2023-10-25 PROCEDURE — 99212 OFFICE O/P EST SF 10 MIN: CPT | Mod: PBBFAC,PN | Performed by: INTERNAL MEDICINE

## 2023-10-25 PROCEDURE — 99999 PR PBB SHADOW E&M-EST. PATIENT-LVL II: ICD-10-PCS | Mod: PBBFAC,,, | Performed by: INTERNAL MEDICINE

## 2023-10-25 RX ORDER — SULFAMETHOXAZOLE AND TRIMETHOPRIM 800; 160 MG/1; MG/1
1 TABLET ORAL 2 TIMES DAILY
Status: DISCONTINUED | OUTPATIENT
Start: 2023-10-25 | End: 2023-11-01

## 2023-10-25 NOTE — PROGRESS NOTES
Subjective     This patient is new to me.  Referring provider:  Dr. Martins for feeding difficulty.      Patient ID: Esvin Low is a 32 y.o. male.    Chief Complaint: peg consult and Dysphagia    Patient seen for feeding difficulty, remote onset, progressively worsening, severe, related to neurologic disease per his mother.  Patient has history of seizure D/O and  shunt in place.  Also with ALS.  Notes from Dr. Martins reviewed and PEG placement recommended.  Patient currently takes some food by mouth but has problems maintaining his nutrition per his mother.  Case was discussed by phone with ID.      Review of Systems   Unable to perform ROS         Objective     Vitals:    10/25/23 1517   BP: (!) 142/96   BP Location: Left arm   Patient Position: Sitting   Pulse: 107         Physical Exam  Constitutional:       Appearance: He is well-developed.      Comments: Thin, frail, wheelchair bound.     HENT:      Head: Normocephalic and atraumatic.   Eyes:      General: No scleral icterus.     Pupils: Pupils are equal, round, and reactive to light.   Neck:      Thyroid: No thyromegaly.   Cardiovascular:      Rate and Rhythm: Normal rate and regular rhythm.      Heart sounds: No murmur heard.  Pulmonary:      Effort: Pulmonary effort is normal.      Breath sounds: Normal breath sounds. No wheezing.   Abdominal:      General: Bowel sounds are normal. There is no distension.      Palpations: Abdomen is soft.      Tenderness: There is no abdominal tenderness.      Comments:  shunt catheter palpable beneath skin on left side of upper abdomen.     Musculoskeletal:      Cervical back: Normal range of motion and neck supple.   Lymphadenopathy:      Cervical: No cervical adenopathy.   Skin:     General: Skin is warm and dry.      Findings: No erythema or rash.   Neurological:      Mental Status: He is alert and oriented to person, place, and time.   Psychiatric:         Behavior: Behavior normal.       Lab Results    Component Value Date    WBC 5.00 07/20/2023    HGB 14.8 07/20/2023    HCT 44.2 07/20/2023    MCV 91 07/20/2023     (L) 07/20/2023         CMP  Sodium   Date Value Ref Range Status   09/23/2023 137 136 - 144 mmol/L Final   07/20/2023 142 136 - 145 mmol/L Final     Potassium   Date Value Ref Range Status   09/23/2023 4.2 3.6 - 5.1 mmol/L Final   07/20/2023 4.6 3.5 - 5.1 mmol/L Final     Comment:     Anion Gap reference range revised on 4/28/2023     Chloride   Date Value Ref Range Status   07/20/2023 108 95 - 110 mmol/L Final     CO2   Date Value Ref Range Status   09/23/2023 23 22 - 32 mmol/L Final   07/20/2023 20 (L) 22 - 31 mmol/L Final     Glucose   Date Value Ref Range Status   07/20/2023 89 70 - 110 mg/dL Final     Comment:     The ADA recommends the following guidelines for fasting glucose:    Normal:       less than 100 mg/dL    Prediabetes:  100 mg/dL to 125 mg/dL    Diabetes:     126 mg/dL or higher       BUN   Date Value Ref Range Status   07/20/2023 11 9 - 21 mg/dL Final     Blood Urea Nitrogen   Date Value Ref Range Status   09/23/2023 9 8 - 20 mg/dL Final     Creatinine   Date Value Ref Range Status   09/23/2023 0.44 (L) 0.90 - 1.30 mg/dL Final   07/20/2023 0.61 0.50 - 1.40 mg/dL Final     Calcium   Date Value Ref Range Status   09/23/2023 8.9 8.9 - 10.3 mg/dL Final   07/20/2023 9.5 8.4 - 10.2 mg/dL Final     Total Protein   Date Value Ref Range Status   09/17/2023 9.0 (H) 6.1 - 7.9 g/dL Final   07/20/2023 8.0 6.0 - 8.4 g/dL Final     Albumin   Date Value Ref Range Status   09/17/2023 3.5 3.5 - 4.8 g/dL Final   07/20/2023 4.1 3.5 - 5.2 g/dL Final     Total Bilirubin   Date Value Ref Range Status   09/17/2023 0.6 0.4 - 2.0 mg/dL Final   07/20/2023 0.4 0.2 - 1.3 mg/dL Final     Alkaline Phosphatase   Date Value Ref Range Status   09/17/2023 87 28 - 116 U/L Final   07/20/2023 91 38 - 145 U/L Final     AST   Date Value Ref Range Status   09/17/2023 13 12 - 40 U/L Final   07/20/2023 29 17 - 59 U/L  Final     ALT   Date Value Ref Range Status   09/17/2023 16 5 - 56 U/L Final   07/20/2023 21 0 - 50 U/L Final     Anion Gap   Date Value Ref Range Status   09/23/2023 7 7 - 16 mmol/L Final   07/20/2023 14 (H) 5 - 12 mmol/L Final     Comment:     Anion Gap reference range revised on 4/28/2023     eGFR   Date Value Ref Range Status   07/20/2023 >60 >60 mL/min/1.73 m^2 Final       Further history was obtained from the patient's mother who was present throughout the interview and states that he has progressive nutritional deficiency.  History is otherwise as above in the HPI.     Case discussed with Dr. Hui with ID as above.    Patient is high risk for procedure given chronic disease.         Assessment and Plan     1. ALS (amyotrophic lateral sclerosis)    2. Complete immobility due to severe physical disability or frailty    3. Feeding difficulty    4. S/P  shunt        EGD for PEG placement  Will given bactrim DS BID x 7 days in perioperative period in addition to regular skin infection prophylaxis.  Further recommendations to follow after above.  Communication will be sent to the referring provider regarding my assessment and plan on this patient via EPIC.           No follow-ups on file.

## 2023-10-25 NOTE — H&P (VIEW-ONLY)
Subjective     This patient is new to me.  Referring provider:  Dr. Martins for feeding difficulty.      Patient ID: Esvin oLw is a 32 y.o. male.    Chief Complaint: peg consult and Dysphagia    Patient seen for feeding difficulty, remote onset, progressively worsening, severe, related to neurologic disease per his mother.  Patient has history of seizure D/O and  shunt in place.  Also with ALS.  Notes from Dr. Martins reviewed and PEG placement recommended.  Patient currently takes some food by mouth but has problems maintaining his nutrition per his mother.  Case was discussed by phone with ID.      Review of Systems   Unable to perform ROS         Objective     Vitals:    10/25/23 1517   BP: (!) 142/96   BP Location: Left arm   Patient Position: Sitting   Pulse: 107         Physical Exam  Constitutional:       Appearance: He is well-developed.      Comments: Thin, frail, wheelchair bound.     HENT:      Head: Normocephalic and atraumatic.   Eyes:      General: No scleral icterus.     Pupils: Pupils are equal, round, and reactive to light.   Neck:      Thyroid: No thyromegaly.   Cardiovascular:      Rate and Rhythm: Normal rate and regular rhythm.      Heart sounds: No murmur heard.  Pulmonary:      Effort: Pulmonary effort is normal.      Breath sounds: Normal breath sounds. No wheezing.   Abdominal:      General: Bowel sounds are normal. There is no distension.      Palpations: Abdomen is soft.      Tenderness: There is no abdominal tenderness.      Comments:  shunt catheter palpable beneath skin on left side of upper abdomen.     Musculoskeletal:      Cervical back: Normal range of motion and neck supple.   Lymphadenopathy:      Cervical: No cervical adenopathy.   Skin:     General: Skin is warm and dry.      Findings: No erythema or rash.   Neurological:      Mental Status: He is alert and oriented to person, place, and time.   Psychiatric:         Behavior: Behavior normal.       Lab Results    Component Value Date    WBC 5.00 07/20/2023    HGB 14.8 07/20/2023    HCT 44.2 07/20/2023    MCV 91 07/20/2023     (L) 07/20/2023         CMP  Sodium   Date Value Ref Range Status   09/23/2023 137 136 - 144 mmol/L Final   07/20/2023 142 136 - 145 mmol/L Final     Potassium   Date Value Ref Range Status   09/23/2023 4.2 3.6 - 5.1 mmol/L Final   07/20/2023 4.6 3.5 - 5.1 mmol/L Final     Comment:     Anion Gap reference range revised on 4/28/2023     Chloride   Date Value Ref Range Status   07/20/2023 108 95 - 110 mmol/L Final     CO2   Date Value Ref Range Status   09/23/2023 23 22 - 32 mmol/L Final   07/20/2023 20 (L) 22 - 31 mmol/L Final     Glucose   Date Value Ref Range Status   07/20/2023 89 70 - 110 mg/dL Final     Comment:     The ADA recommends the following guidelines for fasting glucose:    Normal:       less than 100 mg/dL    Prediabetes:  100 mg/dL to 125 mg/dL    Diabetes:     126 mg/dL or higher       BUN   Date Value Ref Range Status   07/20/2023 11 9 - 21 mg/dL Final     Blood Urea Nitrogen   Date Value Ref Range Status   09/23/2023 9 8 - 20 mg/dL Final     Creatinine   Date Value Ref Range Status   09/23/2023 0.44 (L) 0.90 - 1.30 mg/dL Final   07/20/2023 0.61 0.50 - 1.40 mg/dL Final     Calcium   Date Value Ref Range Status   09/23/2023 8.9 8.9 - 10.3 mg/dL Final   07/20/2023 9.5 8.4 - 10.2 mg/dL Final     Total Protein   Date Value Ref Range Status   09/17/2023 9.0 (H) 6.1 - 7.9 g/dL Final   07/20/2023 8.0 6.0 - 8.4 g/dL Final     Albumin   Date Value Ref Range Status   09/17/2023 3.5 3.5 - 4.8 g/dL Final   07/20/2023 4.1 3.5 - 5.2 g/dL Final     Total Bilirubin   Date Value Ref Range Status   09/17/2023 0.6 0.4 - 2.0 mg/dL Final   07/20/2023 0.4 0.2 - 1.3 mg/dL Final     Alkaline Phosphatase   Date Value Ref Range Status   09/17/2023 87 28 - 116 U/L Final   07/20/2023 91 38 - 145 U/L Final     AST   Date Value Ref Range Status   09/17/2023 13 12 - 40 U/L Final   07/20/2023 29 17 - 59 U/L  Final     ALT   Date Value Ref Range Status   09/17/2023 16 5 - 56 U/L Final   07/20/2023 21 0 - 50 U/L Final     Anion Gap   Date Value Ref Range Status   09/23/2023 7 7 - 16 mmol/L Final   07/20/2023 14 (H) 5 - 12 mmol/L Final     Comment:     Anion Gap reference range revised on 4/28/2023     eGFR   Date Value Ref Range Status   07/20/2023 >60 >60 mL/min/1.73 m^2 Final       Further history was obtained from the patient's mother who was present throughout the interview and states that he has progressive nutritional deficiency.  History is otherwise as above in the HPI.     Case discussed with Dr. Hui with ID as above.    Patient is high risk for procedure given chronic disease.         Assessment and Plan     1. ALS (amyotrophic lateral sclerosis)    2. Complete immobility due to severe physical disability or frailty    3. Feeding difficulty    4. S/P  shunt        EGD for PEG placement  Will given bactrim DS BID x 7 days in perioperative period in addition to regular skin infection prophylaxis.  Further recommendations to follow after above.  Communication will be sent to the referring provider regarding my assessment and plan on this patient via EPIC.           No follow-ups on file.

## 2023-10-30 DIAGNOSIS — R63.30 FEEDING DIFFICULTY: ICD-10-CM

## 2023-10-30 DIAGNOSIS — G12.21 ALS (AMYOTROPHIC LATERAL SCLEROSIS): Primary | ICD-10-CM

## 2023-11-02 RX ORDER — SULFAMETHOXAZOLE AND TRIMETHOPRIM 800; 160 MG/1; MG/1
1 TABLET ORAL 2 TIMES DAILY
Status: SHIPPED | OUTPATIENT
Start: 2023-11-02 | End: 2023-11-09

## 2023-11-02 NOTE — PROGRESS NOTES
Spoke with patient's mother to advise that medication has been sent.      Aklief Pregnancy And Lactation Text: It is unknown if this medication is safe to use during pregnancy.  It is unknown if this medication is excreted in breast milk.  Breastfeeding women should use the topical cream on the smallest area of the skin for the shortest time needed while breastfeeding.  Do not apply to nipple and areola.

## 2023-11-09 ENCOUNTER — ANESTHESIA (OUTPATIENT)
Dept: ENDOSCOPY | Facility: HOSPITAL | Age: 33
End: 2023-11-09
Payer: MEDICARE

## 2023-11-09 ENCOUNTER — ANESTHESIA EVENT (OUTPATIENT)
Dept: ENDOSCOPY | Facility: HOSPITAL | Age: 33
End: 2023-11-09
Payer: MEDICARE

## 2023-11-09 ENCOUNTER — HOSPITAL ENCOUNTER (OUTPATIENT)
Facility: HOSPITAL | Age: 33
Discharge: HOME | End: 2023-11-09
Attending: INTERNAL MEDICINE | Admitting: INTERNAL MEDICINE
Payer: MEDICARE

## 2023-11-09 DIAGNOSIS — Z43.1 PEG (PERCUTANEOUS ENDOSCOPIC GASTROSTOMY) ADJUSTMENT/REPLACEMENT/REMOVAL: ICD-10-CM

## 2023-11-09 PROCEDURE — 25000003 PHARM REV CODE 250: Performed by: INTERNAL MEDICINE

## 2023-11-09 PROCEDURE — 43239 PR EGD, FLEX, W/BIOPSY, SGL/MULTI: ICD-10-PCS | Mod: 51,,, | Performed by: INTERNAL MEDICINE

## 2023-11-09 PROCEDURE — 63600175 PHARM REV CODE 636 W HCPCS: Performed by: NURSE ANESTHETIST, CERTIFIED REGISTERED

## 2023-11-09 PROCEDURE — 88305 TISSUE EXAM BY PATHOLOGIST: CPT | Mod: TC | Performed by: PATHOLOGY

## 2023-11-09 PROCEDURE — D9220A PRA ANESTHESIA: ICD-10-PCS | Mod: CRNA,,, | Performed by: NURSE ANESTHETIST, CERTIFIED REGISTERED

## 2023-11-09 PROCEDURE — 37000008 HC ANESTHESIA 1ST 15 MINUTES: Performed by: INTERNAL MEDICINE

## 2023-11-09 PROCEDURE — 25000003 PHARM REV CODE 250: Performed by: NURSE ANESTHETIST, CERTIFIED REGISTERED

## 2023-11-09 PROCEDURE — 43239 EGD BIOPSY SINGLE/MULTIPLE: CPT | Mod: 51,,, | Performed by: INTERNAL MEDICINE

## 2023-11-09 PROCEDURE — D9220A PRA ANESTHESIA: ICD-10-PCS | Mod: ANES,,, | Performed by: ANESTHESIOLOGY

## 2023-11-09 PROCEDURE — 63600175 PHARM REV CODE 636 W HCPCS: Performed by: INTERNAL MEDICINE

## 2023-11-09 PROCEDURE — D9220A PRA ANESTHESIA: Mod: ANES,,, | Performed by: ANESTHESIOLOGY

## 2023-11-09 PROCEDURE — 43246 EGD PLACE GASTROSTOMY TUBE: CPT | Mod: ,,, | Performed by: INTERNAL MEDICINE

## 2023-11-09 PROCEDURE — 43246 PR EGD, FLEX, W/PLCMT, GASTROSTOMY TUBE: ICD-10-PCS | Mod: ,,, | Performed by: INTERNAL MEDICINE

## 2023-11-09 PROCEDURE — 27201012 HC FORCEPS, HOT/COLD, DISP: Performed by: INTERNAL MEDICINE

## 2023-11-09 PROCEDURE — D9220A PRA ANESTHESIA: Mod: CRNA,,, | Performed by: NURSE ANESTHETIST, CERTIFIED REGISTERED

## 2023-11-09 PROCEDURE — 43239 EGD BIOPSY SINGLE/MULTIPLE: CPT | Performed by: INTERNAL MEDICINE

## 2023-11-09 PROCEDURE — 43246 EGD PLACE GASTROSTOMY TUBE: CPT | Performed by: INTERNAL MEDICINE

## 2023-11-09 PROCEDURE — 88312 SPECIAL STAINS GROUP 1: CPT | Mod: TC | Performed by: PATHOLOGY

## 2023-11-09 RX ORDER — LIDOCAINE HYDROCHLORIDE 20 MG/ML
INJECTION INTRAVENOUS
Status: DISCONTINUED | OUTPATIENT
Start: 2023-11-09 | End: 2023-11-09

## 2023-11-09 RX ORDER — SODIUM CHLORIDE 9 MG/ML
INJECTION, SOLUTION INTRAVENOUS CONTINUOUS
Status: DISCONTINUED | OUTPATIENT
Start: 2023-11-09 | End: 2023-11-09 | Stop reason: HOSPADM

## 2023-11-09 RX ORDER — PROPOFOL 10 MG/ML
VIAL (ML) INTRAVENOUS
Status: DISCONTINUED | OUTPATIENT
Start: 2023-11-09 | End: 2023-11-09

## 2023-11-09 RX ADMIN — PROPOFOL 100 MG: 10 INJECTION, EMULSION INTRAVENOUS at 10:11

## 2023-11-09 RX ADMIN — LIDOCAINE HYDROCHLORIDE 100 MG: 20 INJECTION, SOLUTION INTRAVENOUS at 10:11

## 2023-11-09 RX ADMIN — SODIUM CHLORIDE: 9 INJECTION, SOLUTION INTRAVENOUS at 09:11

## 2023-11-09 RX ADMIN — CEFAZOLIN 2 G: 1 INJECTION, POWDER, FOR SOLUTION INTRAMUSCULAR; INTRAVENOUS at 11:11

## 2023-11-09 NOTE — ANESTHESIA PREPROCEDURE EVALUATION
11/09/2023  Esvin Low is a 32 y.o., male.      Pre-op Assessment    I have reviewed the Patient Summary Reports.     I have reviewed the Nursing Notes. I have reviewed the NPO Status.   I have reviewed the Medications.     Review of Systems  Anesthesia Hx:  Denies Family Hx of Anesthesia complications.  Personal Hx of Anesthesia complications   Cardiovascular:   Hypertension ECG has been reviewed. LVH   Renal/:   Chronic Renal Disease    Neurological:   Neuromuscular Disease, Seizures, well controlled        Physical Exam  General: Cooperative    Airway:  Mallampati: II       Chest/Lungs:  Normal Respiratory Rate    Heart:  Rate: Normal  Rhythm: Regular Rhythm        Anesthesia Plan  Type of Anesthesia, risks & benefits discussed:    Anesthesia Type: Gen Natural Airway  Intra-op Monitoring Plan: Standard ASA Monitors  Induction:  IV  Informed Consent: Informed consent signed with the Patient representative and all parties understand the risks and agree with anesthesia plan.  All questions answered.   ASA Score: 3    Ready For Surgery From Anesthesia Perspective.     .

## 2023-11-09 NOTE — PROVATION PATIENT INSTRUCTIONS
Discharge Summary/Instructions after an Endoscopic Procedure  Patient Name: Esvin Low  Patient MRN: [id number]  Patient YOB: 1990 Thursday, November 9, 2023  Pawel Yoder MD  Dear patient,  As a result of recent federal legislation (The Federal Cures Act), you may   receive lab or pathology results from your procedure in your [facility]   account before your physician is able to contact you. Your physician or   their representative will relay the results to you with their   recommendations at their soonest availability.  Thank you,  RESTRICTIONS:  During your procedure today, you received medications for sedation.  These   medications may affect your judgment, balance and coordination.  Therefore,   for 24 hours, you have the following restrictions:   - DO NOT drive a car, operate machinery, make legal/financial decisions,   sign important papers or drink alcohol.    ACTIVITY:  Today: no heavy lifting, straining or running due to procedural   sedation/anesthesia.  The following day: return to full activity including work.  DIET:  Eat and drink normally unless instructed otherwise.     TREATMENT FOR COMMON SIDE EFFECTS:  - Mild abdominal pain, nausea, belching, bloating or excessive gas:  rest,   eat lightly and use a heating pad.  - Sore Throat: treat with throat lozenges and/or gargle with warm salt   water.  - Because air was used during the procedure, expelling large amounts of air   from your rectum or belching is normal.  - If a bowel prep was taken, you may not have a bowel movement for 1-3 days.    This is normal.  SYMPTOMS TO WATCH FOR AND REPORT TO YOUR PHYSICIAN:  1. Abdominal pain or bloating, other than gas cramps.  2. Chest pain.  3. Back pain.  4. Signs of infection such as: chills or fever occurring within 24 hours   after the procedure.  5. Rectal bleeding, which would show as bright red, maroon, or black stools.   (A tablespoon of blood from the rectum is not serious,  especially if   hemorrhoids are present.)  6. Vomiting.  7. Weakness or dizziness.  GO DIRECTLY TO THE NEAREST EMERGENCY ROOM IF YOU HAVE ANY OF THE FOLLOWING:      Difficulty breathing              Chills and/or fever over 101 F   Persistent vomiting and/or vomiting blood   Severe abdominal pain   Severe chest pain   Black, tarry stools   Bleeding- more than one tablespoon   Any other symptom or condition that you feel may need urgent attention  Your doctor recommends these additional instructions:  If any biopsies were taken, your doctors clinic will contact you in 1 to 2   weeks with any results.  - Patient has a contact number available for emergencies.  The signs and   symptoms of potential delayed complications were discussed with the   patient.  Return to normal activities tomorrow.  Written discharge   instructions were provided to the patient.   - Resume previous diet.   - Continue present medications.   - No aspirin, ibuprofen, naproxen, or other non-steroidal anti-inflammatory   drugs.   - Await pathology results.   - Follow an antireflux regimen.   - Please follow the post-PEG recommendations including: Nutrition consult   for formula and volume, advance food and medications per primary care   provider, external bolster snug to abdominal wall, change dressing on top   of bumper daily, may use PEG today for meds and water and clean site with   soap and water daily and dry thoroughly.  OK to use for feeds in 8 hours.  - Return to GI office PRN.   - Discharge patient to home (via wheelchair).  For questions, problems or results please call your physician - Pawel Yoder MD at Work:  [phone].  [facility] PHONE NUMBER: [phone]  IF A COMPLICATION OR EMERGENCY SITUATION ARISES AND YOU ARE UNABLE TO REACH   YOUR PHYSICIAN - GO DIRECTLY TO THE EMERGENCY ROOM.  Pawel Yoder MD  11/9/2023 11:08:54 AM  This report has been verified and signed electronically.  Dear  patient,  As a result of recent federal legislation (The Federal Cures Act), you may   receive lab or pathology results from your procedure in your [facility]   account before your physician is able to contact you. Your physician or   their representative will relay the results to you with their   recommendations at their soonest availability.  Thank you,  PROVATION

## 2023-11-09 NOTE — ANESTHESIA POSTPROCEDURE EVALUATION
Anesthesia Post Evaluation    Patient: Esvin Low    Procedure(s) Performed: Procedure(s) (LRB):  EGD (ESOPHAGOGASTRODUODENOSCOPY) (N/A)  INSERTION, PEG TUBE (N/A)    Final Anesthesia Type: general      Patient location during evaluation: PACU  Patient participation: Yes- Able to Participate  Level of consciousness: awake and alert  Post-procedure vital signs: reviewed and stable  Pain management: adequate  Airway patency: patent    PONV status at discharge: No PONV  Anesthetic complications: no      Cardiovascular status: blood pressure returned to baseline  Respiratory status: unassisted  Hydration status: euvolemic  Follow-up not needed.          Vitals Value Taken Time   /70 11/09/23 1147   Temp 36.7 °C (98.1 °F) 11/09/23 1118   Pulse 70 11/09/23 1147   Resp 16 11/09/23 1147   SpO2 98 % 11/09/23 1147         Event Time   Out of Recovery 11:48:51         Pain/Andreia Score: Andreia Score: 8 (11/9/2023 11:30 AM)  Modified Andreia Score: 17 (11/9/2023 11:44 AM)

## 2023-11-09 NOTE — TRANSFER OF CARE
"Anesthesia Transfer of Care Note    Patient: Esvin Low    Procedure(s) Performed: Procedure(s) (LRB):  EGD (ESOPHAGOGASTRODUODENOSCOPY) (N/A)  INSERTION, PEG TUBE (N/A)    Patient location: GI    Anesthesia Type: general    Transport from OR: Transported from OR on room air with adequate spontaneous ventilation    Post pain: adequate analgesia    Post assessment: no apparent anesthetic complications and tolerated procedure well    Post vital signs: stable    Level of consciousness: awake and responds to stimulation    Nausea/Vomiting: no nausea/vomiting    Complications: none    Transfer of care protocol was followed      Last vitals:   Visit Vitals  BP (!) 138/100 (BP Location: Right arm, Patient Position: Lying)   Pulse 60   Temp 36.8 °C (98.2 °F) (Skin)   Resp 16   Ht 5' 3" (1.6 m)   Wt 54.4 kg (120 lb)   SpO2 97%   BMI 21.26 kg/m²     "

## 2023-11-09 NOTE — PROGRESS NOTES
Pt transferred to private wheel chair via madonna lift, instructions for care and follow up to patients mother.

## 2023-11-10 VITALS
WEIGHT: 120 LBS | HEART RATE: 70 BPM | DIASTOLIC BLOOD PRESSURE: 70 MMHG | BODY MASS INDEX: 21.26 KG/M2 | HEIGHT: 63 IN | RESPIRATION RATE: 16 BRPM | TEMPERATURE: 98 F | SYSTOLIC BLOOD PRESSURE: 151 MMHG | OXYGEN SATURATION: 98 %